# Patient Record
Sex: FEMALE | Race: ASIAN | NOT HISPANIC OR LATINO | URBAN - METROPOLITAN AREA
[De-identification: names, ages, dates, MRNs, and addresses within clinical notes are randomized per-mention and may not be internally consistent; named-entity substitution may affect disease eponyms.]

---

## 2022-06-24 ENCOUNTER — WEB ENCOUNTER (OUTPATIENT)
Dept: URBAN - METROPOLITAN AREA CLINIC 23 | Facility: CLINIC | Age: 47
End: 2022-06-24

## 2022-06-29 ENCOUNTER — OFFICE VISIT (OUTPATIENT)
Dept: URBAN - METROPOLITAN AREA CLINIC 23 | Facility: CLINIC | Age: 47
End: 2022-06-29
Payer: COMMERCIAL

## 2022-06-29 VITALS
HEART RATE: 73 BPM | BODY MASS INDEX: 33.63 KG/M2 | WEIGHT: 197 LBS | DIASTOLIC BLOOD PRESSURE: 87 MMHG | SYSTOLIC BLOOD PRESSURE: 125 MMHG | TEMPERATURE: 97.3 F | HEIGHT: 64 IN

## 2022-06-29 DIAGNOSIS — R74.8 ABNORMAL LIVER ENZYMES: ICD-10-CM

## 2022-06-29 DIAGNOSIS — L29.0 ANAL ITCHING: ICD-10-CM

## 2022-06-29 PROCEDURE — 99244 OFF/OP CNSLTJ NEW/EST MOD 40: CPT | Performed by: INTERNAL MEDICINE

## 2022-06-29 PROCEDURE — 99204 OFFICE O/P NEW MOD 45 MIN: CPT | Performed by: INTERNAL MEDICINE

## 2022-06-29 RX ORDER — ANASTROZOLE 1 MG/1
1 TABLET TABLET, FILM COATED ORAL ONCE A DAY
Status: ACTIVE | COMMUNITY

## 2022-06-29 RX ORDER — FERROUS GLUCONATE 324(38)MG
TAKE 1 TABLET BY ORAL ROUTE DAILY FOR 30 DAYS TABLET ORAL 1
Qty: 30 | Refills: 1 | Status: ON HOLD | COMMUNITY
Start: 2017-08-08

## 2022-06-29 RX ORDER — BISMUTH SUBCITRATE POTASSIUM, METRONIDAZOLE, TETRACYCLINE HYDROCHLORIDE 140; 125; 125 MG/1; MG/1; MG/1
TAKE 3 CAPSULES BY ORAL ROUTE 4 TIMES PER DAY AFTER MEALS AND AT BEDTIME FOR 10 DAYS CAPSULE ORAL
Qty: 120 | Refills: 0 | Status: ON HOLD | COMMUNITY
Start: 2017-08-07

## 2022-06-29 RX ORDER — DICYCLOMINE HYDROCHLORIDE 20 MG/1
TAKE 1 TABLET BY MOUTH THREE TIMES DAILY AS NEEDED TABLET ORAL
Qty: 90 | Refills: 4 | Status: ON HOLD | COMMUNITY
Start: 2019-05-30

## 2022-06-29 RX ORDER — PANTOPRAZOLE SODIUM 40 MG/1
TAKE 1 TABLET BY MOUTH TWICE DAILY FOR 10 DAYS TABLET, DELAYED RELEASE ORAL
Qty: 20 | Refills: 2 | Status: ON HOLD | COMMUNITY
Start: 2019-05-30

## 2022-06-29 RX ORDER — CLARITHROMYCIN 500 MG/1
TAKE 1 TABLET (500 MG) BY ORAL ROUTE 2 TIMES PER DAY FOR 14 DAYS TABLET, FILM COATED ORAL 2
Qty: 28 | Refills: 0 | Status: ON HOLD | COMMUNITY
Start: 2017-06-07

## 2022-06-29 RX ORDER — HYDROCORTISONE 25 MG/G
1 APPLICATION CREAM TOPICAL TWICE A DAY
Qty: 1 TUBE | Refills: 3 | OUTPATIENT
Start: 2022-06-29 | End: 2022-08-24

## 2022-06-29 NOTE — HPI-TODAY'S VISIT:
46F referred by Dr Robb Castillo for GI eval for abnormal LFTs.  A copy of this document will be sent to the referring physician. [id number], [id number]. Viral hep panel negative. US abd 2019--gallstones, fatty liver Colon 2017--no polyps. rec rpt in 2025 EGD 2019--h pylori gastritis she denies any abd pain, N/V BM everyday. no blood. no change in stool caliber no new meds. c/o anal itching. on and off for >20years. says she has hemorrhoids which cause the itching. OTC hemorrhoid cream used to help but she wants a prescription hemorrhoid cream

## 2022-08-15 ENCOUNTER — OFFICE VISIT (OUTPATIENT)
Dept: URBAN - METROPOLITAN AREA CLINIC 82 | Facility: CLINIC | Age: 47
End: 2022-08-15

## 2023-03-20 ENCOUNTER — TELEPHONE ENCOUNTER (OUTPATIENT)
Dept: URBAN - METROPOLITAN AREA CLINIC 86 | Facility: CLINIC | Age: 48
End: 2023-03-20

## 2023-03-20 PROBLEM — 409063005 COUNSELING: Status: ACTIVE | Noted: 2023-03-20

## 2023-03-20 PROBLEM — 816159004 WEIGHT GAIN: Status: ACTIVE | Noted: 2023-03-20

## 2023-03-20 PROBLEM — 37796009 MIGRAINE: Status: ACTIVE | Noted: 2023-03-20

## 2023-03-20 PROBLEM — 197321007 FATTY LIVER: Status: ACTIVE | Noted: 2023-03-20

## 2023-03-20 PROBLEM — 161646004 H/O: HIGH RISK MEDICATION: Status: ACTIVE | Noted: 2023-03-20

## 2023-03-20 PROBLEM — 166643006 LIVER ENZYMES ABNORMAL: Status: ACTIVE | Noted: 2023-03-20

## 2023-03-20 PROBLEM — 82991003 GENERALIZED ACHES AND PAINS: Status: ACTIVE | Noted: 2023-03-20

## 2023-03-20 PROBLEM — 87522002 IRON DEFICIENCY ANEMIA: Status: ACTIVE | Noted: 2023-03-20

## 2023-03-20 PROBLEM — 414916001 OBESITY: Status: ACTIVE | Noted: 2023-03-20

## 2023-03-20 PROBLEM — 415076002 PERSONAL HISTORY OF PRIMARY MALIGNANT NEOPLASM OF BREAST: Status: ACTIVE | Noted: 2023-03-20

## 2023-03-20 PROBLEM — 40930008 HYPOTHYROIDISM: Status: ACTIVE | Noted: 2023-03-20

## 2023-03-20 NOTE — HPI-TODAY'S VISIT:
Patient is a 47-year-old female being referred in by Dr. Robb Castillo for evaluation of abnormal liver labs and fatty liver. Patient was last seen back on 2022 by Dr. Justin Duran and had been referred to see him for abnormal liver labs.  Patient had an AST of 182 and ALT of 162 and viral hepatitis panel has been negative. It was reported in his note that she had an ultrasound of the abdomen  that showed gallstones and a fatty liver. With regards to colonoscopy she was screened in  with no polyps and was reported to be due for screening in .  EGD in  that showed H. pylori for which she had been treated. She has had some off-and-on anal itching due to hemorrhoids and was being treated with some Anusol HC for that. At the time patient had a weight of 197 height is 64 inches and a BMI of 33.81. She was listed also being on her thyroid medicine as well as vitamin D treatment as well. We saw notes from her primary care doctor sent in from 2022 that showed a white blood cell count of 6.3 hemoglobin 14.2 platelet count of 272 and TSH 3.9 glucose 90 BUN 16 creatinine 0.68 sodium 139 potassium 4.4 chloride 101 calcium 9.4 albumin 4.3 bilirubin 0.5 alkaline phosphatase 150 AST 55 and ALT 76 and HDL cholesterol was 41 cholesterol total was 255 triglycerides were high at 355 and LDL high at 159.  Hepatitis panel was done in 2022 showing hep A IgM negative B core IgM negative B surface antigen negative hep C antibody negative. 2022 labs showed  and  Per the provider note that we could see from 2022 patient had been seen at that point by Dr. Castillo with complaints of the liver labs and had seen endocrine as well.  AST was not listed but the ALT was listed as being 112 alk phos 118. Past history include hypothyroidism, hyperlipidemia, IUD that was removed in 2016.  Breast cancer history in the past.  Obesity.  GERD. Past surgery include tonsillectomy,  x2, colonoscopy, EGD. Social history listed as non-smoker. Home meds include clonidine 0.2 mg patch per week, anastrozole 1 mg once a day, omeprazole 40 mg a day, iron 65 mg a day, MiraLAX powder at night, sumatriptan 100 mg twice a day as needed, Lotemax 0.5% gel drop to eye as needed twice a day, levothyroxine 88 mcg a day. Allergies are to Lortab/latex.  Weight for that doctor was noted to be 196.8 with a height of 5 foot 2 and a BMI 35.99. We do see that she has seen Dr. THEODORA Ross at [facility] with labs from her on 2022 showing  and  alk phos 91 at that time. Curiously we saw labs on 2019 on the patient that showed an AST of 30 ALT of 30 alk phos 69 bilirubin 0.2 hep C antibody negative smooth muscle antibody negative at 6 AMA negative at less than 20 B surface antigen negative, B core total negative Also saw labs from 2018 that showed AST 25 ALT 21 alk phos 67 bilirubin 0.2 and iron sat elevated at 56% at the time 2017 labs showed AST 23 ALT 18 alk phos 96 bilirubin 0.4 and iron sat low at 9% This pattern clearly change and I would suspect that Arimidex is what is causing the labs on this patient to be so fatty and abnormal.  If not able to be improved with other measures then she needs to be looked at for change of medication.  Needs liver imaging to assess fibrosis.   1.	Abnormal liver labs noted to have developed suddenly on the patient between the timeframe of  on and likely related to her breast cancer diagnosis.  Likely due after Arimidex and weight gain that she had and secondarily leading to these issues.  Has gone down from a BMI of 35-33 at last check and needs to continue to work on same.  If not able to be improved we will need to look at coming off of the Arimidex or seeing what other options she has left at this point to consider for her treatment options.  Can complete other screens but so far hepatitis A/B/C screens were negative as well as ASMA, AMA.  Can complete a few other screens but suspect again this is medication and fatty liver related issues from both medication and weight gain.  Only options other than removal medicine would be to attempt a tighter Rivera style diet, exercise with 5 to 10% weight loss, and daily exercise such as walking 30 minutes a day to see if can help. 2.	Fatty liver clearly suspected by weight and labs and other issues and needs liver imaging to follow-up on same.  We will do a fib-4 index to look at degree of fibrosis given that the labs appear to be high for period of time and reassess from there.  Needs to work on weight loss, exercise, and low-carb diet to see if can help with same.  If not improved needs to look at medication removal options. 3.	Breast cancer history with Arimidex usage noted unfortunately weight gain and this combination can lead to significant flattening of the liver which she clearly appears to develop since her labs point from being completely normal and ideal to very high there for period of time.  Last resort would be to look at medication change. 4.	Vaccine counseling has been checked for acute a and B but needs to be checked for immunity to same. 5.	Obesity noted and has lost some from 35 BMI to 33 BMI but needs to work on losing more. 6.	History of hypothyroidism noted on medications for same.  Optimal control of same also will be helpful.  Immune screens appear negative so far. 7.	Iron deficiency with prior EGD and colon and on iron therapy as per hematology.  Has seen [facility]. 8.	History of migraines noted on medication for same. Plan 1.  Complete remaining liver screens. 2.  Do ultrasound imaging. 3.  Reassess post above. Duration of the visit was 0 min with 20 min of chart prep and 20 minutes for this face to face/TeleMed visit with time reviewing their prior and recent records and labs and discussing their current status and future plans for care for the patient.

## 2023-03-28 ENCOUNTER — LAB OUTSIDE AN ENCOUNTER (OUTPATIENT)
Dept: URBAN - METROPOLITAN AREA CLINIC 86 | Facility: CLINIC | Age: 48
End: 2023-03-28

## 2023-03-28 ENCOUNTER — OFFICE VISIT (OUTPATIENT)
Dept: URBAN - METROPOLITAN AREA CLINIC 86 | Facility: CLINIC | Age: 48
End: 2023-03-28
Payer: COMMERCIAL

## 2023-03-28 VITALS
HEIGHT: 64 IN | BODY MASS INDEX: 33.8 KG/M2 | TEMPERATURE: 97.1 F | DIASTOLIC BLOOD PRESSURE: 88 MMHG | HEART RATE: 95 BPM | SYSTOLIC BLOOD PRESSURE: 127 MMHG | WEIGHT: 198 LBS

## 2023-03-28 DIAGNOSIS — E03.9 HYPOTHYROIDISM: ICD-10-CM

## 2023-03-28 DIAGNOSIS — K76.0 FATTY LIVER: ICD-10-CM

## 2023-03-28 DIAGNOSIS — D50.8 ACQUIRED IRON DEFICIENCY ANEMIA DUE TO DECREASED ABSORPTION: ICD-10-CM

## 2023-03-28 DIAGNOSIS — E66.9 OBESITY: ICD-10-CM

## 2023-03-28 PROCEDURE — 99215 OFFICE O/P EST HI 40 MIN: CPT

## 2023-03-28 PROCEDURE — 99245 OFF/OP CONSLTJ NEW/EST HI 55: CPT

## 2023-03-28 RX ORDER — DICYCLOMINE HYDROCHLORIDE 20 MG/1
TAKE 1 TABLET BY MOUTH THREE TIMES DAILY AS NEEDED TABLET ORAL
Qty: 90 | Refills: 4 | Status: DISCONTINUED | COMMUNITY
Start: 2019-05-30

## 2023-03-28 RX ORDER — LOTEPREDNOL ETABONATE 5 MG/ML
1 DROP INTO AFFECTED EYE SUSPENSION/ DROPS OPHTHALMIC
Status: ACTIVE | COMMUNITY

## 2023-03-28 RX ORDER — CLONIDINE 0.1 MG/D
1 PATCH TO SKIN PATCH TRANSDERMAL
Status: ACTIVE | COMMUNITY

## 2023-03-28 RX ORDER — LORATADINE 10 MG
1 PACKET MIXED WITH 8 OUNCES OF FLUID TABLET,DISINTEGRATING ORAL ONCE A DAY
Status: DISCONTINUED | COMMUNITY

## 2023-03-28 RX ORDER — FERROUS SULFATE 325(65) MG
1 TABLET TABLET ORAL ONCE A DAY
Status: DISCONTINUED | COMMUNITY

## 2023-03-28 RX ORDER — VENLAFAXINE HYDROCHLORIDE 37.5 MG/1
1 CAPSULE WITH FOOD CAPSULE, EXTENDED RELEASE ORAL ONCE A DAY
Status: ACTIVE | COMMUNITY

## 2023-03-28 RX ORDER — PANTOPRAZOLE SODIUM 40 MG/1
TAKE 1 TABLET BY MOUTH TWICE DAILY FOR 10 DAYS TABLET, DELAYED RELEASE ORAL
Qty: 20 | Refills: 2 | Status: DISCONTINUED | COMMUNITY
Start: 2019-05-30

## 2023-03-28 RX ORDER — SUMATRIPTAN SUCCINATE 100 MG/1
1 TABLET AT LEAST 2 HOURS BETWEEN DOSES AS NEEDED TABLET, FILM COATED ORAL TWICE A DAY
Status: ACTIVE | COMMUNITY

## 2023-03-28 RX ORDER — ANASTROZOLE 1 MG/1
1 TABLET TABLET, FILM COATED ORAL ONCE A DAY
Status: DISCONTINUED | COMMUNITY

## 2023-03-28 RX ORDER — FERROUS GLUCONATE 324(38)MG
TAKE 1 TABLET BY ORAL ROUTE DAILY FOR 30 DAYS TABLET ORAL 1
Qty: 30 | Refills: 1 | Status: DISCONTINUED | COMMUNITY
Start: 2017-08-08

## 2023-03-28 RX ORDER — BISMUTH SUBCITRATE POTASSIUM, METRONIDAZOLE, TETRACYCLINE HYDROCHLORIDE 140; 125; 125 MG/1; MG/1; MG/1
TAKE 3 CAPSULES BY ORAL ROUTE 4 TIMES PER DAY AFTER MEALS AND AT BEDTIME FOR 10 DAYS CAPSULE ORAL
Qty: 120 | Refills: 0 | Status: DISCONTINUED | COMMUNITY
Start: 2017-08-07

## 2023-03-28 RX ORDER — CLARITHROMYCIN 500 MG/1
TAKE 1 TABLET (500 MG) BY ORAL ROUTE 2 TIMES PER DAY FOR 14 DAYS TABLET, FILM COATED ORAL 2
Qty: 28 | Refills: 0 | Status: DISCONTINUED | COMMUNITY
Start: 2017-06-07

## 2023-03-28 RX ORDER — ANASTROZOLE 1 MG/1
1 TABLET TABLET, FILM COATED ORAL ONCE A DAY
Status: ACTIVE | COMMUNITY

## 2023-03-28 RX ORDER — LEVOTHYROXINE SODIUM 100 UG/1
1 TABLET IN THE MORNING ON AN EMPTY STOMACH TABLET ORAL ONCE A DAY
Status: ACTIVE | COMMUNITY

## 2023-03-28 RX ORDER — OMEPRAZOLE 40 MG/1
1 CAPSULE 30 MINUTES BEFORE MORNING MEAL CAPSULE, DELAYED RELEASE ORAL ONCE A DAY
Status: ACTIVE | COMMUNITY

## 2023-03-28 NOTE — EXAM-PHYSICAL EXAM
Gen: awake and responsive. Eyes: anicteric, normal lids. Mouth: covered with mask. Nose: covered with mask. Hearing: intact grossly. Neck: trachea midline and no jvd. CV: RRR no s3. Lungs: clear. No wheezes, Abd: Soft, nabs, obese, NT. No  liver or spleen enlargement. Ext: some leg edema, some palm erythema. Neuro: moves all 4 ext grossly. No asterixis. Skin: no pruritis and some palm erythema.

## 2023-03-28 NOTE — HPI-TODAY'S VISIT:
Patient is a 47-year-old female being referred in by Dr.J Ross  for evaluation of abnormal liver labs and fatty liver.  A copy of the note will be sent to referring provider.  She says she stopped the arimidex for a month in  and off for 1 month and so that may take longer to see the effect.  Reviewed records from Dr ALEXANDER and from w: Patient was last seen back on 2022 by Dr. Justin Duran and had been referred to see him for abnormal liver labs.  Patient had an AST of 182 and ALT of 162 and viral hepatitis panel has been negative. It was reported in his note that she had an ultrasound of the abdomen  that showed gallstones and a fatty liver.  With regards to colonoscopy she was screened in  with no polyps and was reported to be due for screening in .    EGD in  that showed H. pylori for which she had been treated.  She has had some off-and-on anal itching due to hemorrhoids and was being treated with some Anusol HC for that.  At the time in  patient had a weight of 197 height is 64 inches and a BMI of 33.81.  2023 weight .  She was listed also being on her thyroid medicine as well as vitamin D treatment as well. He did not list the anastrazole at the time.  We saw notes from her primary care doctor sent in from 2022 that showed a white blood cell count of 6.3 hemoglobin 14.2 platelet count of 272 and TSH 3.9 glucose 90 BUN 16 creatinine 0.68 sodium 139 potassium 4.4 chloride 101 calcium 9.4 albumin 4.3 bilirubin 0.5 alkaline phosphatase 150 AST 55 and ALT 76 and HDL cholesterol was 41 cholesterol total was 255 triglycerides were high at 355 and LDL high at 159.  Hepatitis panel was done in 2022 showing hep A IgM negative B core IgM negative B surface antigen negative hep C antibody negative.  She was off the meds for 1m of the anastrazole and the labs lwoer ast 55 and alt 76.   2022 labs showed  and   She says she was not seeing enough change.  Per the provider note that we could see from 2022 patient had been seen at that point by Dr. Castillo with complaints of the liver labs and had seen endocrine as well.  AST was not listed but the ALT was listed as being 112 alk phos 118.  Past history include hypothyroidism, hyperlipidemia, IUD that was removed in 2016.  Breast cancer history in the past.  Obesity.  GERD.  Past surgery include tonsillectomy,  x2, colonoscopy, EGD. 2020 breast surgery.  Social history listed as non-smoker. Not a drinker.  and 3 kids.  Home meds include clonidine 0.2 mg patch per week, anastrozole 1 mg once a day, omeprazole 40 mg a day, iron 65 mg a day, MiraLAX powder at night, sumatriptan 100 mg twice a day as needed, Lotemax 0.5% gel drop to eye as needed twice a day, levothyroxine 88 mcg a day.  Allergies are to Lortab/latex.  Weight for that doctor was noted to be 196.8 with a height of 5 foot 2 and a BMI 35.99.  We do see that she has seen Dr. THEODORA Ross at [facility] with labs from her on 2022 showing  and  alk phos 91 at that time.  Curiously we saw labs on 2019 on the patient that showed an AST of 30 ALT of 30 alk phos 69 bilirubin 0.2 hep C antibody negative smooth muscle antibody negative at 6 AMA negative at less than 20 B surface antigen negative, B core total negative  Also saw labs from 2018 that showed AST 25 ALT 21 alk phos 67 bilirubin 0.2 and iron sat elevated at 56% at the time  2017 labs showed AST 23 ALT 18 alk phos 96 bilirubin 0.4 and iron sat low at 9%  This pattern clearly change and I would suspect that Arimidex is what is causing the labs on this patient to be so fatty and abnormal.  If not able to be improved with other measures then she needs to be looked at for change of medication.  Needs liver imaging to assess fibrosis.    Anastrazole livertox: Serum enzymes are reported to be elevated in 2% to 4% of women treated with anastrozole, but these elevations are usually mild, asymptomatic and self-limited, rarely requiring dose modification. There have been rare instances of clinically apparent liver injury associated with anastrozole therapy, typically arising within 1 to 4 months of starting treatment and having variable presentations but generally with a hepatocellular or mixed serum enzyme pattern (Case 1). Too few instances have been described in the literature to provide specific2 LiverTox characteristics or clinical phenotype. Immunoallergic features (fever, rash, eosinophilia) were not mentioned in published cases, but low levels of autoantibodies were sometimes found. Recovery is usually rapid once anastrozole is stopped. There have been no cases of acute liver failure, chronic hepatitis or vanishing bile duct syndrome attributed to anastrozole use. Unlike tamoxifen, anastrozole has not been associated with development of fatty liver disease, although some degree of steatosis and steatohepatitis have been mentioned in descriptions of liver biopsies from acute cases. According to the product label, anastrozole has been linked to cases of hypersensitivity reactions and Reynolds-Vish syndrome as well as cases of hepatitis with jaundice. Likelihood score: C (probable cause of clinically apparent liver injury  Pt says was on tamoxifen prior and Dr ALEXANDER switched to arimidex.  She is pn a 5 yr planned course and 2 to go.  If she can lose the weight and exercise could still impact the labs and the fat.  She says trying to eat better and do gym.  If not losing then look at the meds.  She asked re ozempic and mounjaro but have to be diabetic.   Plan 1.  Complete remaining liver screens. 2.  Do ultrasound imaging. 3.  Reassess post above. 4.  Needs to work on the weight and issues.  Duration of the visit was 65 min with 30 min of chart prep and 35 minutes for this face to face visit with time reviewing their prior and recent records and labs and discussing their current status and future plans for care for the patient.

## 2023-04-03 ENCOUNTER — OFFICE VISIT (OUTPATIENT)
Dept: URBAN - METROPOLITAN AREA CLINIC 95 | Facility: CLINIC | Age: 48
End: 2023-04-03

## 2023-04-12 ENCOUNTER — OFFICE VISIT (OUTPATIENT)
Dept: URBAN - METROPOLITAN AREA CLINIC 82 | Facility: CLINIC | Age: 48
End: 2023-04-12

## 2023-04-12 RX ORDER — ANASTROZOLE 1 MG/1
1 TABLET TABLET, FILM COATED ORAL ONCE A DAY
COMMUNITY

## 2023-04-12 RX ORDER — CLONIDINE 0.1 MG/D
1 PATCH TO SKIN PATCH TRANSDERMAL
COMMUNITY

## 2023-04-12 RX ORDER — LEVOTHYROXINE SODIUM 100 UG/1
1 TABLET IN THE MORNING ON AN EMPTY STOMACH TABLET ORAL ONCE A DAY
COMMUNITY

## 2023-04-12 RX ORDER — VENLAFAXINE HYDROCHLORIDE 37.5 MG/1
1 CAPSULE WITH FOOD CAPSULE, EXTENDED RELEASE ORAL ONCE A DAY
COMMUNITY

## 2023-04-12 RX ORDER — LOTEPREDNOL ETABONATE 5 MG/ML
1 DROP INTO AFFECTED EYE SUSPENSION/ DROPS OPHTHALMIC
COMMUNITY

## 2023-04-12 RX ORDER — OMEPRAZOLE 40 MG/1
1 CAPSULE 30 MINUTES BEFORE MORNING MEAL CAPSULE, DELAYED RELEASE ORAL ONCE A DAY
COMMUNITY

## 2023-04-12 RX ORDER — SUMATRIPTAN SUCCINATE 100 MG/1
1 TABLET AT LEAST 2 HOURS BETWEEN DOSES AS NEEDED TABLET, FILM COATED ORAL TWICE A DAY
COMMUNITY

## 2023-04-25 ENCOUNTER — LAB OUTSIDE AN ENCOUNTER (OUTPATIENT)
Dept: URBAN - METROPOLITAN AREA CLINIC 86 | Facility: CLINIC | Age: 48
End: 2023-04-25

## 2023-05-04 ENCOUNTER — OFFICE VISIT (OUTPATIENT)
Dept: URBAN - METROPOLITAN AREA CLINIC 86 | Facility: CLINIC | Age: 48
End: 2023-05-04

## 2023-05-04 NOTE — HPI-TODAY'S VISIT:
Patient is a 47-year-old female being referred in by Dr.J Ross  seen 2023  for evaluation of abnormal liver labs and fatty liver.  A copy of the note will be sent to referring provider.  She says she stopped the arimidex for a month in  and off for 1 month and so that may take longer to see the effect.  Reviewed records from Dr ALEXANDER and from w: Patient was last seen back on 2022 by Dr. Justin Duran and had been referred to see him for abnormal liver labs.  Patient had an AST of 182 and ALT of 162 and viral hepatitis panel has been negative. It was reported in his note that she had an ultrasound of the abdomen  that showed gallstones and a fatty liver.  With regards to colonoscopy she was screened in  with no polyps and was reported to be due for screening in .    EGD in  that showed H. pylori for which she had been treated.  She has had some off-and-on anal itching due to hemorrhoids and was being treated with some Anusol HC for that.  At the time in  patient had a weight of 197 height is 64 inches and a BMI of 33.81.  2023 weight .  She was listed also being on her thyroid medicine as well as vitamin D treatment as well. He did not list the anastrazole at the time.  We saw notes from her primary care doctor sent in from 2022 that showed a white blood cell count of 6.3 hemoglobin 14.2 platelet count of 272 and TSH 3.9 glucose 90 BUN 16 creatinine 0.68 sodium 139 potassium 4.4 chloride 101 calcium 9.4 albumin 4.3 bilirubin 0.5 alkaline phosphatase 150 AST 55 and ALT 76 and HDL cholesterol was 41 cholesterol total was 255 triglycerides were high at 355 and LDL high at 159.  Hepatitis panel was done in 2022 showing hep A IgM negative B core IgM negative B surface antigen negative hep C antibody negative.  She was off the meds for 1m of the anastrazole and the labs lwoer ast 55 and alt 76.   2022 labs showed  and   She says she was not seeing enough change.  Per the provider note that we could see from 2022 patient had been seen at that point by Dr. Castillo with complaints of the liver labs and had seen endocrine as well.  AST was not listed but the ALT was listed as being 112 alk phos 118.  Past history include hypothyroidism, hyperlipidemia, IUD that was removed in 2016.  Breast cancer history in the past.  Obesity.  GERD.  Past surgery include tonsillectomy,  x2, colonoscopy, EGD. 2020 breast surgery.  Social history listed as non-smoker. Not a drinker.  and 3 kids.  Home meds include clonidine 0.2 mg patch per week, anastrozole 1 mg once a day, omeprazole 40 mg a day, iron 65 mg a day, MiraLAX powder at night, sumatriptan 100 mg twice a day as needed, Lotemax 0.5% gel drop to eye as needed twice a day, levothyroxine 88 mcg a day.  Allergies are to Lortab/latex.  Weight for that doctor was noted to be 196.8 with a height of 5 foot 2 and a BMI 35.99.  We do see that she has seen Dr. THEODORA Ross at [facility] with labs from her on 2022 showing  and  alk phos 91 at that time.  Curiously we saw labs on 2019 on the patient that showed an AST of 30 ALT of 30 alk phos 69 bilirubin 0.2 hep C antibody negative smooth muscle antibody negative at 6 AMA negative at less than 20 B surface antigen negative, B core total negative  Also saw labs from 2018 that showed AST 25 ALT 21 alk phos 67 bilirubin 0.2 and iron sat elevated at 56% at the time  2017 labs showed AST 23 ALT 18 alk phos 96 bilirubin 0.4 and iron sat low at 9%  This pattern clearly change and I would suspect that Arimidex is what is causing the labs on this patient to be so fatty and abnormal.  If not able to be improved with other measures then she needs to be looked at for change of medication.  Needs liver imaging to assess fibrosis.    Anastrazole livertox: Serum enzymes are reported to be elevated in 2% to 4% of women treated with anastrozole, but these elevations are usually mild, asymptomatic and self-limited, rarely requiring dose modification. There have been rare instances of clinically apparent liver injury associated with anastrozole therapy, typically arising within 1 to 4 months of starting treatment and having variable presentations but generally with a hepatocellular or mixed serum enzyme pattern (Case 1). Too few instances have been described in the literature to provide specific2 LiverTox characteristics or clinical phenotype. Immunoallergic features (fever, rash, eosinophilia) were not mentioned in published cases, but low levels of autoantibodies were sometimes found. Recovery is usually rapid once anastrozole is stopped. There have been no cases of acute liver failure, chronic hepatitis or vanishing bile duct syndrome attributed to anastrozole use. Unlike tamoxifen, anastrozole has not been associated with development of fatty liver disease, although some degree of steatosis and steatohepatitis have been mentioned in descriptions of liver biopsies from acute cases. According to the product label, anastrozole has been linked to cases of hypersensitivity reactions and Reynolds-Vish syndrome as well as cases of hepatitis with jaundice. Likelihood score: C (probable cause of clinically apparent liver injury  Pt says was on tamoxifen prior and Dr ALEXANDER switched to arimidex.  She is pn a 5 yr planned course and 2 to go.  If she can lose the weight and exercise could still impact the labs and the fat.  She says trying to eat better and do gym.  If not losing then look at the meds.  She asked re ozempic and mounjaro but have to be diabetic.   Plan 1.  Complete remaining liver screens. 2.  Do ultrasound imaging. 3.  Reassess post above. 4.  Needs to work on the weight and issues.  Duration of the visit was  min with 10 min of chart prep and 20 minutes for this face to face visit with time reviewing their prior and recent records and labs and discussing their current status and future plans for care for the patient.

## 2023-05-31 ENCOUNTER — OFFICE VISIT (OUTPATIENT)
Dept: URBAN - METROPOLITAN AREA CLINIC 86 | Facility: CLINIC | Age: 48
End: 2023-05-31

## 2023-05-31 ENCOUNTER — TELEPHONE ENCOUNTER (OUTPATIENT)
Dept: URBAN - METROPOLITAN AREA CLINIC 86 | Facility: CLINIC | Age: 48
End: 2023-05-31

## 2023-05-31 RX ORDER — CLONIDINE 0.1 MG/D
1 PATCH TO SKIN PATCH TRANSDERMAL
COMMUNITY

## 2023-05-31 RX ORDER — VENLAFAXINE HYDROCHLORIDE 37.5 MG/1
1 CAPSULE WITH FOOD CAPSULE, EXTENDED RELEASE ORAL ONCE A DAY
COMMUNITY

## 2023-05-31 RX ORDER — SUMATRIPTAN SUCCINATE 100 MG/1
1 TABLET AT LEAST 2 HOURS BETWEEN DOSES AS NEEDED TABLET, FILM COATED ORAL TWICE A DAY
COMMUNITY

## 2023-05-31 RX ORDER — LOTEPREDNOL ETABONATE 5 MG/ML
1 DROP INTO AFFECTED EYE SUSPENSION/ DROPS OPHTHALMIC
COMMUNITY

## 2023-05-31 RX ORDER — OMEPRAZOLE 40 MG/1
1 CAPSULE 30 MINUTES BEFORE MORNING MEAL CAPSULE, DELAYED RELEASE ORAL ONCE A DAY
COMMUNITY

## 2023-05-31 RX ORDER — ANASTROZOLE 1 MG/1
1 TABLET TABLET, FILM COATED ORAL ONCE A DAY
COMMUNITY

## 2023-05-31 RX ORDER — LEVOTHYROXINE SODIUM 100 UG/1
1 TABLET IN THE MORNING ON AN EMPTY STOMACH TABLET ORAL ONCE A DAY
COMMUNITY

## 2023-05-31 NOTE — HPI-TODAY'S VISIT:
Patient is a 47-year-old female being referred in by Dr.J Ross  seen 2023  for evaluation of abnormal liver labs and fatty liver.  A copy of the note will be sent to referring provider.  She says she stopped the arimidex for a month in  and off for 1 month and so that may take longer to see the effect.  Reviewed records from Dr ALEXANDER and from w: Patient was last seen back on 2022 by Dr. Justin Duran and had been referred to see him for abnormal liver labs.  Patient had an AST of 182 and ALT of 162 and viral hepatitis panel has been negative. It was reported in his note that she had an ultrasound of the abdomen  that showed gallstones and a fatty liver.  With regards to colonoscopy she was screened in  with no polyps and was reported to be due for screening in .    EGD in  that showed H. pylori for which she had been treated.  She has had some off-and-on anal itching due to hemorrhoids and was being treated with some Anusol HC for that.  At the time in  patient had a weight of 197 height is 64 inches and a BMI of 33.81.  2023 weight .  She was listed also being on her thyroid medicine as well as vitamin D treatment as well. He did not list the anastrazole at the time.  We saw notes from her primary care doctor sent in from 2022 that showed a white blood cell count of 6.3 hemoglobin 14.2 platelet count of 272 and TSH 3.9 glucose 90 BUN 16 creatinine 0.68 sodium 139 potassium 4.4 chloride 101 calcium 9.4 albumin 4.3 bilirubin 0.5 alkaline phosphatase 150 AST 55 and ALT 76 and HDL cholesterol was 41 cholesterol total was 255 triglycerides were high at 355 and LDL high at 159.  Hepatitis panel was done in 2022 showing hep A IgM negative B core IgM negative B surface antigen negative hep C antibody negative.  She was off the meds for 1m of the anastrazole and the labs lwoer ast 55 and alt 76.   2022 labs showed  and   She says she was not seeing enough change.  Per the provider note that we could see from 2022 patient had been seen at that point by Dr. Castillo with complaints of the liver labs and had seen endocrine as well.  AST was not listed but the ALT was listed as being 112 alk phos 118.  Past history include hypothyroidism, hyperlipidemia, IUD that was removed in 2016.  Breast cancer history in the past.  Obesity.  GERD.  Past surgery include tonsillectomy,  x2, colonoscopy, EGD. 2020 breast surgery.  Social history listed as non-smoker. Not a drinker.  and 3 kids.  Home meds include clonidine 0.2 mg patch per week, anastrozole 1 mg once a day, omeprazole 40 mg a day, iron 65 mg a day, MiraLAX powder at night, sumatriptan 100 mg twice a day as needed, Lotemax 0.5% gel drop to eye as needed twice a day, levothyroxine 88 mcg a day.  Allergies are to Lortab/latex.  Weight for that doctor was noted to be 196.8 with a height of 5 foot 2 and a BMI 35.99.  We do see that she has seen Dr. THEODORA Ross at [facility] with labs from her on 2022 showing  and  alk phos 91 at that time.  Curiously we saw labs on 2019 on the patient that showed an AST of 30 ALT of 30 alk phos 69 bilirubin 0.2 hep C antibody negative smooth muscle antibody negative at 6 AMA negative at less than 20 B surface antigen negative, B core total negative  Also saw labs from 2018 that showed AST 25 ALT 21 alk phos 67 bilirubin 0.2 and iron sat elevated at 56% at the time  2017 labs showed AST 23 ALT 18 alk phos 96 bilirubin 0.4 and iron sat low at 9%  This pattern clearly change and I would suspect that Arimidex is what is causing the labs on this patient to be so fatty and abnormal.  If not able to be improved with other measures then she needs to be looked at for change of medication.  Needs liver imaging to assess fibrosis.    Anastrazole livertox: Serum enzymes are reported to be elevated in 2% to 4% of women treated with anastrozole, but these elevations are usually mild, asymptomatic and self-limited, rarely requiring dose modification. There have been rare instances of clinically apparent liver injury associated with anastrozole therapy, typically arising within 1 to 4 months of starting treatment and having variable presentations but generally with a hepatocellular or mixed serum enzyme pattern (Case 1). Too few instances have been described in the literature to provide specific2 LiverTox characteristics or clinical phenotype. Immunoallergic features (fever, rash, eosinophilia) were not mentioned in published cases, but low levels of autoantibodies were sometimes found. Recovery is usually rapid once anastrozole is stopped. There have been no cases of acute liver failure, chronic hepatitis or vanishing bile duct syndrome attributed to anastrozole use. Unlike tamoxifen, anastrozole has not been associated with development of fatty liver disease, although some degree of steatosis and steatohepatitis have been mentioned in descriptions of liver biopsies from acute cases. According to the product label, anastrozole has been linked to cases of hypersensitivity reactions and Reynolds-Vish syndrome as well as cases of hepatitis with jaundice. Likelihood score: C (probable cause of clinically apparent liver injury  Pt says was on tamoxifen prior and Dr ALEXANDER switched to arimidex.  She is pn a 5 yr planned course and 2 to go.  If she can lose the weight and exercise could still impact the labs and the fat.  She says trying to eat better and do gym.  If not losing then look at the meds.  She asked re ozempic and mounjaro but have to be diabetic.   Plan 1.  Complete remaining liver screens. 2.  Do ultrasound imaging. 3.  Reassess post above. 4.  Needs to work on the weight and issues.  Duration of the visit was  min with 10 min of chart prep and 20 minutes for this face to face visit with time reviewing their prior and recent records and labs and discussing their current status and future plans for care for the patient.

## 2023-07-17 ENCOUNTER — OFFICE VISIT (OUTPATIENT)
Dept: URBAN - METROPOLITAN AREA CLINIC 82 | Facility: CLINIC | Age: 48
End: 2023-07-17
Payer: COMMERCIAL

## 2023-07-17 ENCOUNTER — LAB OUTSIDE AN ENCOUNTER (OUTPATIENT)
Dept: URBAN - METROPOLITAN AREA CLINIC 82 | Facility: CLINIC | Age: 48
End: 2023-07-17

## 2023-07-17 VITALS
DIASTOLIC BLOOD PRESSURE: 88 MMHG | SYSTOLIC BLOOD PRESSURE: 126 MMHG | TEMPERATURE: 96.4 F | WEIGHT: 199.2 LBS | HEART RATE: 94 BPM | HEIGHT: 64 IN | BODY MASS INDEX: 34.01 KG/M2

## 2023-07-17 DIAGNOSIS — E66.9 OBESITY (BMI 30.0-34.9): ICD-10-CM

## 2023-07-17 DIAGNOSIS — K64.1 BLEEDING GRADE II HEMORRHOIDS: ICD-10-CM

## 2023-07-17 DIAGNOSIS — R14.0 BLOATING: ICD-10-CM

## 2023-07-17 DIAGNOSIS — A04.8 H. PYLORI INFECTION: ICD-10-CM

## 2023-07-17 DIAGNOSIS — K59.09 CHRONIC CONSTIPATION: ICD-10-CM

## 2023-07-17 PROCEDURE — 99214 OFFICE O/P EST MOD 30 MIN: CPT | Performed by: INTERNAL MEDICINE

## 2023-07-17 PROCEDURE — 46221 LIGATION OF HEMORRHOID(S): CPT | Performed by: INTERNAL MEDICINE

## 2023-07-17 RX ORDER — VENLAFAXINE HYDROCHLORIDE 37.5 MG/1
1 CAPSULE WITH FOOD CAPSULE, EXTENDED RELEASE ORAL ONCE A DAY
Status: DISCONTINUED | COMMUNITY

## 2023-07-17 RX ORDER — OMEPRAZOLE 40 MG/1
1 CAPSULE 30 MINUTES BEFORE MORNING MEAL CAPSULE, DELAYED RELEASE ORAL ONCE A DAY
Qty: 30 | Refills: 1 | OUTPATIENT
Start: 2023-07-17

## 2023-07-17 RX ORDER — LEVOTHYROXINE SODIUM 100 UG/1
1 TABLET IN THE MORNING ON AN EMPTY STOMACH TABLET ORAL ONCE A DAY
Status: ACTIVE | COMMUNITY

## 2023-07-17 RX ORDER — POLYETHYLENE GLYCOL 3350 17 G/17G
1 SCOOP MIXED WITH 8 OUNCES OF FLUID POWDER, FOR SOLUTION ORAL
Qty: 30 | Refills: 1 | OUTPATIENT
Start: 2023-07-17 | End: 2023-08-16

## 2023-07-17 RX ORDER — ANASTROZOLE 1 MG/1
1 TABLET TABLET, FILM COATED ORAL ONCE A DAY
Status: ACTIVE | COMMUNITY

## 2023-07-17 RX ORDER — SUMATRIPTAN SUCCINATE 100 MG/1
1 TABLET AT LEAST 2 HOURS BETWEEN DOSES AS NEEDED TABLET, FILM COATED ORAL TWICE A DAY
Status: ACTIVE | COMMUNITY

## 2023-07-17 RX ORDER — CLONIDINE 0.1 MG/D
1 PATCH TO SKIN PATCH TRANSDERMAL
Status: DISCONTINUED | COMMUNITY

## 2023-07-17 RX ORDER — OMEPRAZOLE 40 MG/1
1 CAPSULE 30 MINUTES BEFORE MORNING MEAL CAPSULE, DELAYED RELEASE ORAL ONCE A DAY
Status: DISCONTINUED | COMMUNITY

## 2023-07-17 RX ORDER — LOTEPREDNOL ETABONATE 5 MG/ML
1 DROP INTO AFFECTED EYE SUSPENSION/ DROPS OPHTHALMIC
Status: DISCONTINUED | COMMUNITY

## 2023-07-17 NOTE — HPI-TODAY'S VISIT:
hx bayron, treated x3, years ago, EGD w reatined food as well. worsening upper abd bloating, am nausea, not after eating. hemorrhoids prolapse and irritate and make BMs difficult, constipation.

## 2023-07-24 ENCOUNTER — OFFICE VISIT (OUTPATIENT)
Dept: URBAN - METROPOLITAN AREA SURGERY CENTER 13 | Facility: SURGERY CENTER | Age: 48
End: 2023-07-24
Payer: COMMERCIAL

## 2023-07-24 ENCOUNTER — CLAIMS CREATED FROM THE CLAIM WINDOW (OUTPATIENT)
Dept: URBAN - METROPOLITAN AREA CLINIC 4 | Facility: CLINIC | Age: 48
End: 2023-07-24
Payer: COMMERCIAL

## 2023-07-24 DIAGNOSIS — K29.60 OTHER GASTRITIS WITHOUT BLEEDING: ICD-10-CM

## 2023-07-24 DIAGNOSIS — K31.A0 GASTRIC INTESTINAL METAPLASIA, UNSPECIFIED: ICD-10-CM

## 2023-07-24 DIAGNOSIS — R11.0 NAUSEA: ICD-10-CM

## 2023-07-24 DIAGNOSIS — K31.89 OTHER DISEASES OF STOMACH AND DUODENUM: ICD-10-CM

## 2023-07-24 DIAGNOSIS — R10.33 PERIUMBILICAL ABDOMINAL PAIN: ICD-10-CM

## 2023-07-24 PROCEDURE — 88342 IMHCHEM/IMCYTCHM 1ST ANTB: CPT | Performed by: PATHOLOGY

## 2023-07-24 PROCEDURE — G8907 PT DOC NO EVENTS ON DISCHARG: HCPCS | Performed by: INTERNAL MEDICINE

## 2023-07-24 PROCEDURE — 88305 TISSUE EXAM BY PATHOLOGIST: CPT | Performed by: PATHOLOGY

## 2023-07-24 PROCEDURE — 43239 EGD BIOPSY SINGLE/MULTIPLE: CPT | Performed by: INTERNAL MEDICINE

## 2023-07-24 RX ORDER — POLYETHYLENE GLYCOL 3350 17 G/17G
1 SCOOP MIXED WITH 8 OUNCES OF FLUID POWDER, FOR SOLUTION ORAL
Qty: 30 | Refills: 1 | Status: ACTIVE | COMMUNITY
Start: 2023-07-17 | End: 2023-08-16

## 2023-07-24 RX ORDER — LEVOTHYROXINE SODIUM 100 UG/1
1 TABLET IN THE MORNING ON AN EMPTY STOMACH TABLET ORAL ONCE A DAY
Status: ACTIVE | COMMUNITY

## 2023-07-24 RX ORDER — OMEPRAZOLE 40 MG/1
1 CAPSULE 30 MINUTES BEFORE MORNING MEAL CAPSULE, DELAYED RELEASE ORAL ONCE A DAY
Qty: 30 | Refills: 1 | Status: ACTIVE | COMMUNITY
Start: 2023-07-17

## 2023-07-24 RX ORDER — ANASTROZOLE 1 MG/1
1 TABLET TABLET, FILM COATED ORAL ONCE A DAY
Status: ACTIVE | COMMUNITY

## 2023-07-24 RX ORDER — SUMATRIPTAN SUCCINATE 100 MG/1
1 TABLET AT LEAST 2 HOURS BETWEEN DOSES AS NEEDED TABLET, FILM COATED ORAL TWICE A DAY
Status: ACTIVE | COMMUNITY

## 2023-08-10 ENCOUNTER — OFFICE VISIT (OUTPATIENT)
Dept: URBAN - METROPOLITAN AREA CLINIC 82 | Facility: CLINIC | Age: 48
End: 2023-08-10
Payer: COMMERCIAL

## 2023-08-10 ENCOUNTER — LAB OUTSIDE AN ENCOUNTER (OUTPATIENT)
Dept: URBAN - METROPOLITAN AREA CLINIC 82 | Facility: CLINIC | Age: 48
End: 2023-08-10

## 2023-08-10 VITALS
BODY MASS INDEX: 34.38 KG/M2 | WEIGHT: 201.4 LBS | HEART RATE: 98 BPM | TEMPERATURE: 97.5 F | DIASTOLIC BLOOD PRESSURE: 86 MMHG | SYSTOLIC BLOOD PRESSURE: 129 MMHG | HEIGHT: 64 IN

## 2023-08-10 DIAGNOSIS — K64.1 GRADE II HEMORRHOIDS: ICD-10-CM

## 2023-08-10 DIAGNOSIS — K76.0 NAFLD (NONALCOHOLIC FATTY LIVER DISEASE): ICD-10-CM

## 2023-08-10 DIAGNOSIS — K58.1 IRRITABLE BOWEL SYNDROME WITH CONSTIPATION: ICD-10-CM

## 2023-08-10 DIAGNOSIS — R79.89 ABNORMAL LFTS: ICD-10-CM

## 2023-08-10 PROBLEM — 70342003: Status: ACTIVE | Noted: 2023-08-10

## 2023-08-10 PROBLEM — 440630006: Status: ACTIVE | Noted: 2023-08-10

## 2023-08-10 PROCEDURE — 99213 OFFICE O/P EST LOW 20 MIN: CPT | Performed by: INTERNAL MEDICINE

## 2023-08-10 PROCEDURE — 46221 LIGATION OF HEMORRHOID(S): CPT | Performed by: INTERNAL MEDICINE

## 2023-08-10 RX ORDER — OMEPRAZOLE 40 MG/1
1 CAPSULE 30 MINUTES BEFORE MORNING MEAL CAPSULE, DELAYED RELEASE ORAL ONCE A DAY
Qty: 30 | Refills: 1 | Status: ACTIVE | COMMUNITY
Start: 2023-07-17

## 2023-08-10 RX ORDER — LEVOTHYROXINE SODIUM 100 UG/1
1 TABLET IN THE MORNING ON AN EMPTY STOMACH TABLET ORAL ONCE A DAY
Status: ACTIVE | COMMUNITY

## 2023-08-10 RX ORDER — POLYETHYLENE GLYCOL 3350 17 G/17G
1 SCOOP MIXED WITH 8 OUNCES OF FLUID POWDER, FOR SOLUTION ORAL
Qty: 30 | Refills: 1 | Status: ACTIVE | COMMUNITY
Start: 2023-07-17 | End: 2023-08-16

## 2023-08-10 RX ORDER — ANASTROZOLE 1 MG/1
1 TABLET TABLET, FILM COATED ORAL ONCE A DAY
Status: ACTIVE | COMMUNITY

## 2023-08-10 RX ORDER — SUMATRIPTAN SUCCINATE 100 MG/1
1 TABLET AT LEAST 2 HOURS BETWEEN DOSES AS NEEDED TABLET, FILM COATED ORAL TWICE A DAY
Status: ACTIVE | COMMUNITY

## 2023-08-10 NOTE — HPI-TODAY'S VISIT:
Abnml LFT w pcp. HBsAg/HBsAc total/HCV ab all negative. CT abdomen '22 w fatty liver. EGD 7/2023 w gastritis, no hp. Miralax/fiber controls constipation. Prior hx: hx hpylori, treated x3, years ago, EGD w reatined food as well. worsening upper abd bloating, am nausea, not after eating. hemorrhoids prolapse and irritate and make BMs difficult, constipation. hx lap rosa isela

## 2023-08-16 ENCOUNTER — ERX REFILL RESPONSE (OUTPATIENT)
Dept: URBAN - METROPOLITAN AREA CLINIC 82 | Facility: CLINIC | Age: 48
End: 2023-08-16

## 2023-08-16 RX ORDER — OMEPRAZOLE 40 MG/1
1 CAPSULE 30 MINUTES BEFORE MORNING MEAL CAPSULE, DELAYED RELEASE ORAL ONCE A DAY
Qty: 30 | Refills: 1 | OUTPATIENT

## 2023-08-16 RX ORDER — OMEPRAZOLE 40 MG/1
TAKE 1 CAPSULE BY MOUTH ONCE DAILY IN THE MORNING 30  MINUTES  BEFORE  MORNING  MEAL CAPSULE, DELAYED RELEASE ORAL
Qty: 30 CAPSULE | Refills: 0 | OUTPATIENT

## 2023-08-19 LAB
ACTIN (SMOOTH MUSCLE) ANTIBODY (IGG): <20
ALBUMIN/GLOBULIN RATIO: 1.8
ALBUMIN: 4.4
ALKALINE PHOSPHATASE: 116
ALPHA-1-ANTITRYPSIN QN: 137
ALT: 123
ANA SCREEN, IFA: NEGATIVE
AST: 144
BILIRUBIN, TOTAL: 0.3
BUN/CREATININE RATIO: (no result)
CALCIUM: 9.9
CARBON DIOXIDE: 23
CERULOPLASMIN: 33
CHLORIDE: 103
CREATINE KINASE,TOTAL: 134
CREATININE: 0.62
EGFR: 110
FERRITIN, SERUM: 73
FIB 4 INDEX: 2.21
FIB 4 INTERPRETATION: (no result)
GLOBULIN: 2.5
GLUCOSE: 168
HEMATOCRIT: 39.6
HEMOGLOBIN A1C: 7.2
HEMOGLOBIN: 12.6
IMMUNOGLOBULIN G, QN, SERUM: 724
IRON BIND.CAP.(TIBC): 594
IRON SATURATION: 10
IRON: 58
LKM-1 ANTIBODY (IGG): <=20
MCH: 26.4
MCHC: 31.8
MCV: 82.8
MITOCHONDRIAL (M2) ANTIBODY: <=20
MPV: 10
PLATELET COUNT: 282
PLATELET COUNT: 282
POTASSIUM: 4.2
PROTEIN, TOTAL: 6.9
RDW: 14.2
RED BLOOD CELL COUNT: 4.78
RHEUMATOID FACTOR: <14
SJOGREN'S ANTIBODY (SS-A): (no result)
SJOGREN'S ANTIBODY (SS-B): (no result)
SODIUM: 139
SOLUBLE LIVER ANTIGEN (SLA) AUTOANTIBODY: <20.1
UREA NITROGEN (BUN): 14
WHITE BLOOD CELL COUNT: 5.1

## 2023-08-28 ENCOUNTER — OFFICE VISIT (OUTPATIENT)
Dept: URBAN - METROPOLITAN AREA CLINIC 82 | Facility: CLINIC | Age: 48
End: 2023-08-28

## 2023-08-28 RX ORDER — ANASTROZOLE 1 MG/1
1 TABLET TABLET, FILM COATED ORAL ONCE A DAY
Status: ACTIVE | COMMUNITY

## 2023-08-28 RX ORDER — OMEPRAZOLE 40 MG/1
TAKE 1 CAPSULE BY MOUTH ONCE DAILY IN THE MORNING 30  MINUTES  BEFORE  MORNING  MEAL CAPSULE, DELAYED RELEASE ORAL
Qty: 30 CAPSULE | Refills: 0 | Status: ACTIVE | COMMUNITY

## 2023-08-28 RX ORDER — LEVOTHYROXINE SODIUM 100 UG/1
1 TABLET IN THE MORNING ON AN EMPTY STOMACH TABLET ORAL ONCE A DAY
Status: ACTIVE | COMMUNITY

## 2023-08-28 RX ORDER — SUMATRIPTAN SUCCINATE 100 MG/1
1 TABLET AT LEAST 2 HOURS BETWEEN DOSES AS NEEDED TABLET, FILM COATED ORAL TWICE A DAY
Status: ACTIVE | COMMUNITY

## 2023-09-10 ENCOUNTER — TELEPHONE ENCOUNTER (OUTPATIENT)
Dept: URBAN - METROPOLITAN AREA CLINIC 23 | Facility: CLINIC | Age: 48
End: 2023-09-10

## 2023-09-10 RX ORDER — HYDROCORTISONE 25 MG/G
1 APPLICATION CREAM TOPICAL TWICE A DAY
Qty: 1 TUBE | Refills: 3 | OUTPATIENT
Start: 2023-09-10 | End: 2023-11-05

## 2023-12-13 ENCOUNTER — OFFICE VISIT (OUTPATIENT)
Dept: URBAN - METROPOLITAN AREA CLINIC 82 | Facility: CLINIC | Age: 48
End: 2023-12-13

## 2023-12-14 ENCOUNTER — OFFICE VISIT (OUTPATIENT)
Dept: URBAN - METROPOLITAN AREA CLINIC 82 | Facility: CLINIC | Age: 48
End: 2023-12-14
Payer: COMMERCIAL

## 2023-12-14 ENCOUNTER — LAB OUTSIDE AN ENCOUNTER (OUTPATIENT)
Dept: URBAN - METROPOLITAN AREA CLINIC 82 | Facility: CLINIC | Age: 48
End: 2023-12-14

## 2023-12-14 ENCOUNTER — OFFICE VISIT (OUTPATIENT)
Dept: URBAN - METROPOLITAN AREA CLINIC 82 | Facility: CLINIC | Age: 48
End: 2023-12-14

## 2023-12-14 ENCOUNTER — DASHBOARD ENCOUNTERS (OUTPATIENT)
Age: 48
End: 2023-12-14

## 2023-12-14 VITALS
HEIGHT: 64 IN | SYSTOLIC BLOOD PRESSURE: 114 MMHG | BODY MASS INDEX: 33.12 KG/M2 | DIASTOLIC BLOOD PRESSURE: 79 MMHG | WEIGHT: 194 LBS | TEMPERATURE: 97.8 F | HEART RATE: 80 BPM

## 2023-12-14 DIAGNOSIS — K76.0 NAFLD (NONALCOHOLIC FATTY LIVER DISEASE): ICD-10-CM

## 2023-12-14 DIAGNOSIS — K62.89 RECTAL PAIN: ICD-10-CM

## 2023-12-14 DIAGNOSIS — K64.0 GRADE I HEMORRHOIDS: ICD-10-CM

## 2023-12-14 DIAGNOSIS — K64.8 INTERNAL HEMORRHOIDS: ICD-10-CM

## 2023-12-14 PROCEDURE — 99213 OFFICE O/P EST LOW 20 MIN: CPT | Performed by: INTERNAL MEDICINE

## 2023-12-14 PROCEDURE — 46221 LIGATION OF HEMORRHOID(S): CPT | Performed by: INTERNAL MEDICINE

## 2023-12-14 RX ORDER — ANASTROZOLE 1 MG/1
1 TABLET TABLET, FILM COATED ORAL ONCE A DAY
Status: ACTIVE | COMMUNITY

## 2023-12-14 RX ORDER — SUMATRIPTAN SUCCINATE 100 MG/1
1 TABLET AT LEAST 2 HOURS BETWEEN DOSES AS NEEDED TABLET, FILM COATED ORAL TWICE A DAY
Status: ACTIVE | COMMUNITY

## 2023-12-14 RX ORDER — SUMATRIPTAN SUCCINATE 100 MG/1
1 TABLET AT LEAST 2 HOURS BETWEEN DOSES AS NEEDED TABLET, FILM COATED ORAL TWICE A DAY
Status: DISCONTINUED | COMMUNITY

## 2023-12-14 RX ORDER — COCOA BUTTER, PHENYLEPHRINE HYDROCHLORIDE 2211; 6.25 MG/1; MG/1
AS DIRECTED SUPPOSITORY RECTAL TWICE DAILY
Qty: 20 | Refills: 0 | OUTPATIENT
Start: 2023-12-14 | End: 2023-12-24

## 2023-12-14 RX ORDER — OMEPRAZOLE 40 MG/1
TAKE 1 CAPSULE BY MOUTH ONCE DAILY IN THE MORNING 30  MINUTES  BEFORE  MORNING  MEAL CAPSULE, DELAYED RELEASE ORAL
Qty: 30 CAPSULE | Refills: 0 | Status: DISCONTINUED | COMMUNITY

## 2023-12-14 RX ORDER — LEVOTHYROXINE SODIUM 100 UG/1
1 TABLET IN THE MORNING ON AN EMPTY STOMACH TABLET ORAL ONCE A DAY
Status: ACTIVE | COMMUNITY

## 2023-12-14 RX ORDER — OMEPRAZOLE 40 MG/1
TAKE 1 CAPSULE BY MOUTH ONCE DAILY IN THE MORNING 30  MINUTES  BEFORE  MORNING  MEAL CAPSULE, DELAYED RELEASE ORAL
Qty: 30 CAPSULE | Refills: 0 | Status: ACTIVE | COMMUNITY

## 2023-12-14 NOTE — HPI-TODAY'S VISIT:
Rectal pains. int constipation. Labs reviewed, diabetes, a1c 7.2% follow up that w pcp. Liver enzymes ast/alt elevated, other labs normal. HBsAg/HBsAc total/HCV ab all negative w pcp. CT abdomen '22 w fatty liver. EGD 7/2023 w gastritis, no hp. Miralax/fiber controls constipation. Prior hx: hx hpylori, treated x3, years ago, EGD w reatined food as well. worsening upper abd bloating, am nausea, not after eating. hemorrhoids prolapse and irritate and make BMs difficult, constipation. hx lap rosa isela. Colonoscopy '17 w int hem.

## 2024-06-13 ENCOUNTER — OFFICE VISIT (OUTPATIENT)
Dept: URBAN - METROPOLITAN AREA CLINIC 82 | Facility: CLINIC | Age: 49
End: 2024-06-13

## 2024-07-10 ENCOUNTER — OFFICE VISIT (OUTPATIENT)
Dept: URBAN - METROPOLITAN AREA CLINIC 82 | Facility: CLINIC | Age: 49
End: 2024-07-10

## 2024-08-12 ENCOUNTER — OFFICE VISIT (OUTPATIENT)
Dept: URBAN - METROPOLITAN AREA CLINIC 82 | Facility: CLINIC | Age: 49
End: 2024-08-12

## 2024-08-12 RX ORDER — ANASTROZOLE 1 MG/1
1 TABLET TABLET, FILM COATED ORAL ONCE A DAY
COMMUNITY

## 2024-08-12 RX ORDER — LEVOTHYROXINE SODIUM 100 UG/1
1 TABLET IN THE MORNING ON AN EMPTY STOMACH TABLET ORAL ONCE A DAY
COMMUNITY

## 2024-09-26 ENCOUNTER — LAB OUTSIDE AN ENCOUNTER (OUTPATIENT)
Dept: URBAN - METROPOLITAN AREA CLINIC 82 | Facility: CLINIC | Age: 49
End: 2024-09-26

## 2024-09-26 ENCOUNTER — OFFICE VISIT (OUTPATIENT)
Dept: URBAN - METROPOLITAN AREA CLINIC 82 | Facility: CLINIC | Age: 49
End: 2024-09-26
Payer: COMMERCIAL

## 2024-09-26 VITALS
DIASTOLIC BLOOD PRESSURE: 75 MMHG | WEIGHT: 182.2 LBS | HEIGHT: 64 IN | HEART RATE: 90 BPM | SYSTOLIC BLOOD PRESSURE: 108 MMHG | BODY MASS INDEX: 31.1 KG/M2 | TEMPERATURE: 97.2 F

## 2024-09-26 DIAGNOSIS — K64.8 INTERNAL HEMORRHOIDS: ICD-10-CM

## 2024-09-26 DIAGNOSIS — Z12.11 COLON CANCER SCREENING: ICD-10-CM

## 2024-09-26 DIAGNOSIS — K76.0 NAFLD (NONALCOHOLIC FATTY LIVER DISEASE): ICD-10-CM

## 2024-09-26 PROCEDURE — 46221 LIGATION OF HEMORRHOID(S): CPT | Performed by: INTERNAL MEDICINE

## 2024-09-26 PROCEDURE — 99213 OFFICE O/P EST LOW 20 MIN: CPT | Performed by: INTERNAL MEDICINE

## 2024-09-26 RX ORDER — ANASTROZOLE 1 MG/1
1 TABLET TABLET, FILM COATED ORAL ONCE A DAY
Status: ACTIVE | COMMUNITY

## 2024-09-26 RX ORDER — LEVOTHYROXINE SODIUM 100 UG/1
1 TABLET IN THE MORNING ON AN EMPTY STOMACH TABLET ORAL ONCE A DAY
Status: ACTIVE | COMMUNITY

## 2024-09-26 NOTE — HPI-TODAY'S VISIT:
Hemorrhoids bothersome again, take up space, irritate, hard to get stool past. Otherwise nml BM. topical without relief. Prior hx 12/2023: Rectal pains. int constipation.s/p banding.  Labs reviewed, diabetes, a1c 7.2% follow up that w pcp. Liver enzymes ast/alt elevated, other labs normal. HBsAg/HBsAc total/HCV ab all negative w pcp. CT abdomen '22 w fatty liver. EGD 7/2023 w gastritis, no hp. Miralax/fiber controls constipation. Prior hx: hx hpylori, treated x3, years ago, EGD w reatined food as well. worsening upper abd bloating, am nausea, not after eating. hemorrhoids prolapse and irritate and make BMs difficult, constipation. hx lap rosa isela. Colonoscopy '17 w int hem.

## 2024-10-17 ENCOUNTER — CLAIMS CREATED FROM THE CLAIM WINDOW (OUTPATIENT)
Dept: URBAN - METROPOLITAN AREA SURGERY CENTER 13 | Facility: SURGERY CENTER | Age: 49
End: 2024-10-17
Payer: COMMERCIAL

## 2024-10-17 ENCOUNTER — CLAIMS CREATED FROM THE CLAIM WINDOW (OUTPATIENT)
Dept: URBAN - METROPOLITAN AREA CLINIC 4 | Facility: CLINIC | Age: 49
End: 2024-10-17
Payer: COMMERCIAL

## 2024-10-17 DIAGNOSIS — K64.0 GRADE I INTERNAL HEMORRHOIDS: ICD-10-CM

## 2024-10-17 DIAGNOSIS — D12.4 ADENOMA OF DESCENDING COLON: ICD-10-CM

## 2024-10-17 DIAGNOSIS — D12.4 BENIGN NEOPLASM OF DESCENDING COLON: ICD-10-CM

## 2024-10-17 DIAGNOSIS — Z12.11 ENCOUNTER FOR SCREENING FOR MALIGNANT NEOPLASM OF COLON: ICD-10-CM

## 2024-10-17 DIAGNOSIS — Z12.11 COLON CANCER SCREENING: ICD-10-CM

## 2024-10-17 PROCEDURE — 88305 TISSUE EXAM BY PATHOLOGIST: CPT | Performed by: PATHOLOGY

## 2024-10-17 PROCEDURE — 45380 COLONOSCOPY AND BIOPSY: CPT | Performed by: INTERNAL MEDICINE

## 2024-10-17 PROCEDURE — 46221 LIGATION OF HEMORRHOID(S): CPT | Performed by: INTERNAL MEDICINE

## 2024-10-17 PROCEDURE — 00812 ANES LWR INTST SCR COLSC: CPT | Performed by: ANESTHESIOLOGY

## 2024-10-17 RX ORDER — ANASTROZOLE 1 MG/1
1 TABLET TABLET, FILM COATED ORAL ONCE A DAY
Status: ACTIVE | COMMUNITY

## 2024-10-17 RX ORDER — LEVOTHYROXINE SODIUM 100 UG/1
1 TABLET IN THE MORNING ON AN EMPTY STOMACH TABLET ORAL ONCE A DAY
Status: ACTIVE | COMMUNITY